# Patient Record
Sex: MALE | Race: BLACK OR AFRICAN AMERICAN | NOT HISPANIC OR LATINO
[De-identification: names, ages, dates, MRNs, and addresses within clinical notes are randomized per-mention and may not be internally consistent; named-entity substitution may affect disease eponyms.]

---

## 2022-09-28 PROBLEM — Z00.00 ENCOUNTER FOR PREVENTIVE HEALTH EXAMINATION: Status: ACTIVE | Noted: 2022-09-28

## 2022-10-10 ENCOUNTER — NON-APPOINTMENT (OUTPATIENT)
Age: 32
End: 2022-10-10

## 2022-10-11 ENCOUNTER — APPOINTMENT (OUTPATIENT)
Dept: ENDOCRINOLOGY | Facility: CLINIC | Age: 32
End: 2022-10-11

## 2022-10-11 VITALS
HEIGHT: 70 IN | DIASTOLIC BLOOD PRESSURE: 83 MMHG | HEART RATE: 93 BPM | BODY MASS INDEX: 20.04 KG/M2 | WEIGHT: 140 LBS | SYSTOLIC BLOOD PRESSURE: 129 MMHG

## 2022-10-11 PROCEDURE — 36415 COLL VENOUS BLD VENIPUNCTURE: CPT

## 2022-10-11 PROCEDURE — 99205 OFFICE O/P NEW HI 60 MIN: CPT | Mod: 25

## 2022-10-12 LAB
ALBUMIN SERPL ELPH-MCNC: 4.8 G/DL
ALP BLD-CCNC: 50 U/L
ALT SERPL-CCNC: 10 U/L
ANION GAP SERPL CALC-SCNC: 11 MMOL/L
AST SERPL-CCNC: 16 U/L
BILIRUB SERPL-MCNC: 0.8 MG/DL
BUN SERPL-MCNC: 5 MG/DL
CALCIUM SERPL-MCNC: 9.3 MG/DL
CHLORIDE SERPL-SCNC: 102 MMOL/L
CO2 SERPL-SCNC: 27 MMOL/L
CREAT SERPL-MCNC: 0.82 MG/DL
EGFR: 119 ML/MIN/1.73M2
ESTRADIOL SERPL-MCNC: 36 PG/ML
FSH SERPL-MCNC: 1.4 IU/L
GLUCOSE SERPL-MCNC: 83 MG/DL
POTASSIUM SERPL-SCNC: 4.3 MMOL/L
PROT SERPL-MCNC: 7 G/DL
SODIUM SERPL-SCNC: 140 MMOL/L
TSH SERPL-ACNC: 2.84 UIU/ML

## 2022-10-12 RX ORDER — ESTRADIOL 2 MG/1
2 TABLET ORAL
Qty: 270 | Refills: 3 | Status: ACTIVE | COMMUNITY
Start: 2022-10-12 | End: 1900-01-01

## 2022-10-14 NOTE — ASSESSMENT
[FreeTextEntry1] : transgender health\par reviewed r/b/a and s.e of HRT w/ estrogen. reviewed different treatment modalities. reviewed treatment targets and goals for feminization and reduction of androgen effects. ready for HRT, prefer oral estradial. start at 6 mg PO QD and titrate to target. Verbalized understanding and agrees with treatment plan, will contact MD and seek emergency medical care if condition changes. restart spironolactone 50 mg, monitor CMP. Start finasteride to reduce androgenic hair loss.\par

## 2022-10-14 NOTE — HISTORY OF PRESENT ILLNESS
[FreeTextEntry1] : 32 y/o  w/o PMH\par here for initial evaluation and management of HRT\par generally feels well and endorses no acute complaints.\par reports past HRT w/ estradiol 6 mg QD PO w/ good results, in addition to spironolactone 50 mg. Reports cessation of therapy ~ 6 months ago, but interested on resuming. notes main interest is fat redistribution to hip. hopes to avoid breast development. Hopes to reduce facial hair growth and improve hair health. otherwise denies any f/c, CP, SOB, palpitations, tremors, depressed mood, anxiety, palpitations, n/v, stool/urinary abn, skin/weight changes, heat/cold intolerance, HAs, breast/nipple changes, polyuria/polydipsia/nocturia or other complaints.\par \par

## 2022-10-18 LAB
TESTOST FREE SERPL-MCNC: 4.6 PG/ML
TESTOST SERPL-MCNC: 367 NG/DL

## 2022-10-21 LAB — ESTRONE SERPL-MCNC: 327 PG/ML

## 2023-01-17 ENCOUNTER — APPOINTMENT (OUTPATIENT)
Dept: ENDOCRINOLOGY | Facility: CLINIC | Age: 33
End: 2023-01-17
Payer: COMMERCIAL

## 2023-01-17 VITALS
HEIGHT: 70 IN | WEIGHT: 139 LBS | SYSTOLIC BLOOD PRESSURE: 115 MMHG | HEART RATE: 90 BPM | DIASTOLIC BLOOD PRESSURE: 82 MMHG | BODY MASS INDEX: 19.9 KG/M2

## 2023-01-17 DIAGNOSIS — L65.9 NONSCARRING HAIR LOSS, UNSPECIFIED: ICD-10-CM

## 2023-01-17 DIAGNOSIS — Z79.899 OTHER LONG TERM (CURRENT) DRUG THERAPY: ICD-10-CM

## 2023-01-17 DIAGNOSIS — Z78.9 OTHER SPECIFIED HEALTH STATUS: ICD-10-CM

## 2023-01-17 PROCEDURE — 99214 OFFICE O/P EST MOD 30 MIN: CPT

## 2023-01-24 NOTE — ASSESSMENT
[FreeTextEntry1] : transgender health\par reviewed r/b/a and s.e of HRT w/ estrogen. reviewed different treatment modalities. reviewed treatment targets and goals for feminization and reduction of androgen effects. ready for HRT, prefer oral estradial. start at 6 mg PO QD and titrate to target. Verbalized understanding and agrees with treatment plan, will contact MD and seek emergency medical care if condition changes. hold spironolactone 50 mg, Start finasteride to reduce androgenic hair loss.\par

## 2023-01-24 NOTE — HISTORY OF PRESENT ILLNESS
[FreeTextEntry1] : 33 y/o  w/o PMH\par initial evaluation and management of HRT\par generally feels well and endorses no acute complaints.\par reports past HRT w/ estradiol 6 mg QD PO w/ good results, in addition to spironolactone 50 mg. Reports cessation of therapy ~ 6 months ago, but interested on resuming. notes main interest is fat redistribution to hip. hopes to avoid breast development. Hopes to reduce facial hair growth and improve hair health. \par \par 1/2023 Here for /fu, generally feels well and endorses no acute complaints. No interval events since LV. Today reports adequate estrogen effects, concerned about gynecomastia w/ spironolactone\par otherwise denies any f/c, CP, SOB, palpitations, tremors, depressed mood, anxiety, palpitations, n/v, stool/urinary abn, skin/weight changes, heat/cold intolerance, HAs, breast/nipple changes, polyuria/polydipsia/nocturia or other complaints.\par \par

## 2023-03-21 ENCOUNTER — RX RENEWAL (OUTPATIENT)
Age: 33
End: 2023-03-21

## 2023-03-21 RX ORDER — FINASTERIDE 5 MG/1
5 TABLET, FILM COATED ORAL
Qty: 90 | Refills: 0 | Status: ACTIVE | COMMUNITY
Start: 2022-10-12 | End: 1900-01-01

## 2023-05-16 ENCOUNTER — APPOINTMENT (OUTPATIENT)
Dept: ENDOCRINOLOGY | Facility: CLINIC | Age: 33
End: 2023-05-16